# Patient Record
Sex: MALE | Race: WHITE
[De-identification: names, ages, dates, MRNs, and addresses within clinical notes are randomized per-mention and may not be internally consistent; named-entity substitution may affect disease eponyms.]

---

## 2019-10-06 ENCOUNTER — HOSPITAL ENCOUNTER (OUTPATIENT)
Dept: HOSPITAL 76 - EMS | Age: 24
Discharge: ANOTHER HOSPITAL | End: 2019-10-06
Attending: SURGERY
Payer: SELF-PAY

## 2019-10-06 ENCOUNTER — HOSPITAL ENCOUNTER (EMERGENCY)
Dept: HOSPITAL 76 - ED | Age: 24
Discharge: HOME | End: 2019-10-06
Payer: SELF-PAY

## 2019-10-06 VITALS — SYSTOLIC BLOOD PRESSURE: 160 MMHG | DIASTOLIC BLOOD PRESSURE: 103 MMHG

## 2019-10-06 DIAGNOSIS — F17.200: ICD-10-CM

## 2019-10-06 DIAGNOSIS — R41.82: Primary | ICD-10-CM

## 2019-10-06 DIAGNOSIS — R40.20: Primary | ICD-10-CM

## 2019-10-06 DIAGNOSIS — R06.9: ICD-10-CM

## 2019-10-06 DIAGNOSIS — R00.0: ICD-10-CM

## 2019-10-06 DIAGNOSIS — R03.0: ICD-10-CM

## 2019-10-06 DIAGNOSIS — R22.0: ICD-10-CM

## 2019-10-06 LAB
ALBUMIN DIAFP-MCNC: 5.3 G/DL (ref 3.2–5.5)
ALBUMIN/GLOB SERPL: 1.7 {RATIO} (ref 1–2.2)
ALP SERPL-CCNC: 72 IU/L (ref 42–121)
ALT SERPL W P-5'-P-CCNC: 32 IU/L (ref 10–60)
ANION GAP SERPL CALCULATED.4IONS-SCNC: 13 MMOL/L (ref 6–13)
AST SERPL W P-5'-P-CCNC: 30 IU/L (ref 10–42)
BASOPHILS NFR BLD AUTO: 0.1 10^3/UL (ref 0–0.1)
BASOPHILS NFR BLD AUTO: 0.3 %
BILIRUB BLD-MCNC: 1 MG/DL (ref 0.2–1)
BUN SERPL-MCNC: 10 MG/DL (ref 6–20)
CALCIUM UR-MCNC: 9.3 MG/DL (ref 8.5–10.3)
CHLORIDE SERPL-SCNC: 101 MMOL/L (ref 101–111)
CO2 SERPL-SCNC: 28 MMOL/L (ref 21–32)
CREAT SERPLBLD-SCNC: 0.9 MG/DL (ref 0.6–1.2)
EOSINOPHIL # BLD AUTO: 0.1 10^3/UL (ref 0–0.7)
EOSINOPHIL NFR BLD AUTO: 0.4 %
ERYTHROCYTE [DISTWIDTH] IN BLOOD BY AUTOMATED COUNT: 12.2 % (ref 12–15)
GFRSERPLBLD MDRD-ARVRAT: 105 ML/MIN/{1.73_M2} (ref 89–?)
GLOBULIN SER-MCNC: 3.2 G/DL (ref 2.1–4.2)
GLUCOSE SERPL-MCNC: 116 MG/DL (ref 70–100)
HGB UR QL STRIP: 16.1 G/DL (ref 14–18)
LIPASE SERPL-CCNC: 25 U/L (ref 22–51)
LYMPHOCYTES # SPEC AUTO: 2 10^3/UL (ref 1.5–3.5)
LYMPHOCYTES NFR BLD AUTO: 11.9 %
MANUAL DIF COMMENT BLD-IMP: (no result)
MCH RBC QN AUTO: 29.7 PG (ref 27–31)
MCHC RBC AUTO-ENTMCNC: 32.2 G/DL (ref 32–36)
MCV RBC AUTO: 92.3 FL (ref 80–94)
MONOCYTES # BLD AUTO: 1.7 10^3/UL (ref 0–1)
MONOCYTES NFR BLD AUTO: 10.2 %
NEUTROPHILS # BLD AUTO: 12.8 10^3/UL (ref 1.5–6.6)
NEUTROPHILS # SNV AUTO: 16.6 X10^3/UL (ref 4.8–10.8)
NEUTROPHILS NFR BLD AUTO: 76.6 %
PDW BLD AUTO: 10.1 FL (ref 7.4–11.4)
PLAT MORPH BLD: (no result)
PLATELET # BLD: 264 10^3/UL (ref 130–450)
PLATELET BLD QL SMEAR: (no result)
PROT SPEC-MCNC: 8.5 G/DL (ref 6.7–8.2)
RBC MAR: 5.42 10^6/UL (ref 4.7–6.1)
RBC MORPH BLD: (no result)
SODIUM SERPLBLD-SCNC: 142 MMOL/L (ref 135–145)

## 2019-10-06 PROCEDURE — 80320 DRUG SCREEN QUANTALCOHOLS: CPT

## 2019-10-06 PROCEDURE — 80053 COMPREHEN METABOLIC PANEL: CPT

## 2019-10-06 PROCEDURE — 85025 COMPLETE CBC W/AUTO DIFF WBC: CPT

## 2019-10-06 PROCEDURE — 36415 COLL VENOUS BLD VENIPUNCTURE: CPT

## 2019-10-06 PROCEDURE — 99284 EMERGENCY DEPT VISIT MOD MDM: CPT

## 2019-10-06 PROCEDURE — 83690 ASSAY OF LIPASE: CPT

## 2019-10-06 NOTE — ED PHYSICIAN DOCUMENTATION
PD HPI ALTERED MENTAL STATUS





- Stated complaint


Stated Complaint: ETOH





- Chief complaint


Chief Complaint: Neuro





- History obtained from


History obtained from: Patient





- History of Present Illness


Timing - onset: Today


Timing - details: Now resolved


Quality / character: Unresponsive


Associated symptoms: No: Fever, Headache, Dyspnea, NVD


Contributing factors: Intoxicated


Basline status: Alert and oriented X 3, Ambulatory, Independent


Treatment PTA: Narcan


Similar symptoms before: No diagnosis


Recently seen: Not recently seen





- Additional information


Additional information: 





BIBA. 


Patient tells me I guess I passed out, and had some hypoxia. He says he had 

drank large amount of liquor tonight. He says he has had little sleep and has 

been eating less than usual due to being upset over recent death of a friend 

(patient says he recently returned from [place] where he attended the ). He 

says he has had episodes of syncope or near-syncope in the past without 

diagnosis.


Per medic report, friend found patient minimally responsive and called 911. 

medics arrived to find patient hypoxic, administered supplemental oxygen and 

gave patient narcan. Patient arrives AAOx3. 





Review of Systems


Constitutional: reports: Reviewed and negative


Cardiac: reports: Reviewed and negative


Respiratory: reports: Reviewed and negative


GI: reports: Reviewed and negative


Musculoskeletal: reports: Reviewed and negative


Neurologic: reports: Altered mental status.  denies: Generalized weakness, Focal

weakness, Numbness, Seizure, Headache, Head injury





PD PAST MEDICAL HISTORY





- Past Medical History


Past Medical History: Yes





- Past Surgical History


Past Surgical History: Yes


Ortho: Shoulder arthroplasty





- Present Medications


Home Medications: 


                                Ambulatory Orders











 Medication  Instructions  Recorded  Confirmed


 


No Known Home Medications  10/06/19 10/06/19














- Allergies


Allergies/Adverse Reactions: 


                                    Allergies











Allergy/AdvReac Type Severity Reaction Status Date / Time


 


No Known Drug Allergies Allergy   Verified 10/06/19 19:37














- Social History


Does the pt smoke?: Yes


Smoking Status: Current some day smoker


ETOH Use: Liquor





PD ED PE NORMAL





- Vitals


Vital signs reviewed: Yes





- General


General: Alert and oriented X 3, No acute distress, Well developed/nourished





- HEENT


HEENT: Atraumatic, PERRL, EOMI, Moist mucous membranes





- Neck


Neck: No bony TTP





- Cardiac


Cardiac: No murmur





- Respiratory


Respiratory: No respiratory distress, Clear bilaterally





- Derm


Derm: Normal color, Warm and dry





- Extremities


Extremities: No deformity, Normal ROM s pain, No edema





- Neuro


Neuro: Alert and oriented X 3, CNs 2-12 intact, No motor deficit, No sensory 

deficit, Normal speech


Eye Opening: Spontaneous


Motor: Obeys Commands


Verbal: Oriented


GCS Score: 15





PD ED PE EXPANDED





- Cardiac


Cardiac: Tachy, Regular Rhythm





Results





- Vitals


Vitals: 


                                     Oxygen











O2 Source                      Room air

















- Labs


Labs: 


                                Laboratory Tests











  10/06/19 10/06/19





  17:50 17:50


 


WBC   16.6 H


 


RBC   5.42


 


Hgb   16.1


 


Hct   50.0


 


MCV   92.3


 


MCH   29.7


 


MCHC   32.2


 


RDW   12.2


 


Plt Count   264


 


MPV   10.1


 


Neut # (Auto)   12.8 H


 


Lymph # (Auto)   2.0


 


Mono # (Auto)   1.7 H


 


Eos # (Auto)   0.1


 


Baso # (Auto)   0.1


 


Absolute Nucleated RBC   0.00


 


Band Neuts % (Manual)   Not Reportable


 


Abnorm Lymph % (Manual)   Not Reportable


 


Nucleated RBC %   0.0


 


Neutrophils # (Manual)   Not Reportable


 


Lymphocytes # (Manual)   Not Reportable


 


Monocytes # (Manual)   Not Reportable


 


Eosinophils # (Manual)   Not Reportable


 


Basophils # (Manual)   Not Reportable


 


Differential Comment   MANUAL=AUTO DIFF


 


Manual Slide Review   Indicated


 


Platelet Estimate   NORMAL (130-450,000)


 


Platelet Morphology   NORMAL APPEARANCE


 


RBC Morph Micro Appear   NORMAL APPEARANCE


 


Sodium  142 


 


Potassium  3.9 


 


Chloride  101 


 


Carbon Dioxide  28 


 


Anion Gap  13.0 


 


BUN  10 


 


Creatinine  0.9 


 


Estimated GFR (MDRD)  105 


 


Glucose  116 H 


 


Calcium  9.3 


 


Total Bilirubin  1.0 


 


AST  30 


 


ALT  32 


 


Alkaline Phosphatase  72 


 


Total Protein  8.5 H 


 


Albumin  5.3 


 


Globulin  3.2 


 


Albumin/Globulin Ratio  1.7 


 


Lipase  25 


 


Ethyl Alcohol  53.4 














PD MEDICAL DECISION MAKING





- ED course


Complexity details: reviewed results, re-evaluated patient, considered 

differential, d/w patient


ED course: 





Patient arrived via ambulance prior to the beginning of my shift and thus I was 

not present when medics gave report to ED staff. ED staff that received report 

inform me that narcan was given, but it is unclear whether there was a temporal 

response to this medication. He is AAOx3 during ED stay. He says he is unable to

provide urine sample (due to not feeling like he has to urinate) and he and 

friend repeatedly asking for patient discharge home. I asked patient twice if 

drug use could be a factor and he answers not to my knowledge, and not that 

Im aware of. He insists he drank to excess tonight despite his alcohol level 

being 53.4 (0.05 BOUCHRA). 


Patient is AAOx3 and insists on discharge.


I advised patient of his high blood pressure and tachycardia and instructed him 

to see his primary care provider regarding these findings. 





Departure





- Departure


Disposition: 01 Home, Self Care


Clinical Impression: 


Altered mental status


Qualifiers:


 Altered mental status type: unspecified Qualified Code(s): R41.82 - Altered 

mental status, unspecified





Condition: Good


Instructions:  ED Altered Loc


Follow-Up: 


[facility] [place] [Provider Group]


Forms:  Activity restrictions


Discharge Date/Time: 10/06/19 21:54